# Patient Record
Sex: FEMALE | Race: WHITE | NOT HISPANIC OR LATINO | Employment: OTHER | URBAN - METROPOLITAN AREA
[De-identification: names, ages, dates, MRNs, and addresses within clinical notes are randomized per-mention and may not be internally consistent; named-entity substitution may affect disease eponyms.]

---

## 2023-09-19 PROBLEM — R20.2 TINGLING: Status: ACTIVE | Noted: 2023-09-19

## 2023-09-19 PROBLEM — L40.9 PSORIASIS: Status: ACTIVE | Noted: 2023-09-19

## 2023-09-19 PROBLEM — M81.0 AGE-RELATED OSTEOPOROSIS WITHOUT CURRENT PATHOLOGICAL FRACTURE: Status: ACTIVE | Noted: 2023-09-19

## 2023-09-19 PROBLEM — N95.2 ATROPHIC VAGINITIS: Status: ACTIVE | Noted: 2023-09-19

## 2023-09-19 PROBLEM — R03.0 ELEVATED BLOOD PRESSURE READING: Status: ACTIVE | Noted: 2023-09-19

## 2023-09-19 PROBLEM — L40.50 PSORIATIC ARTHRITIS (MULTI): Status: ACTIVE | Noted: 2023-09-19

## 2023-09-19 PROBLEM — E03.8 SUBCLINICAL HYPOTHYROIDISM: Status: ACTIVE | Noted: 2023-09-19

## 2023-09-19 RX ORDER — ETANERCEPT 50 MG/ML
SOLUTION SUBCUTANEOUS
COMMUNITY
End: 2023-10-12 | Stop reason: ALTCHOICE

## 2023-09-19 RX ORDER — IBUPROFEN 200 MG
TABLET ORAL
COMMUNITY

## 2023-09-19 RX ORDER — MECOBALAMIN 1000 MCG
TABLET,CHEWABLE ORAL
COMMUNITY

## 2023-10-12 ENCOUNTER — OFFICE VISIT (OUTPATIENT)
Dept: PRIMARY CARE | Facility: CLINIC | Age: 68
End: 2023-10-12
Payer: MEDICARE

## 2023-10-12 VITALS
WEIGHT: 184 LBS | BODY MASS INDEX: 25.76 KG/M2 | HEIGHT: 71 IN | RESPIRATION RATE: 16 BRPM | HEART RATE: 57 BPM | SYSTOLIC BLOOD PRESSURE: 120 MMHG | OXYGEN SATURATION: 96 % | DIASTOLIC BLOOD PRESSURE: 80 MMHG

## 2023-10-12 DIAGNOSIS — R73.9 HYPERGLYCEMIA: ICD-10-CM

## 2023-10-12 DIAGNOSIS — L40.50 PSORIATIC ARTHRITIS (MULTI): ICD-10-CM

## 2023-10-12 DIAGNOSIS — Z13.220 LIPID SCREENING: ICD-10-CM

## 2023-10-12 DIAGNOSIS — Z00.00 ROUTINE GENERAL MEDICAL EXAMINATION AT A HEALTH CARE FACILITY: Primary | ICD-10-CM

## 2023-10-12 DIAGNOSIS — H04.123 DRY EYES: ICD-10-CM

## 2023-10-12 DIAGNOSIS — Z12.31 ENCOUNTER FOR SCREENING MAMMOGRAM FOR MALIGNANT NEOPLASM OF BREAST: ICD-10-CM

## 2023-10-12 DIAGNOSIS — L40.9 PSORIASIS: ICD-10-CM

## 2023-10-12 DIAGNOSIS — E03.8 SUBCLINICAL HYPOTHYROIDISM: ICD-10-CM

## 2023-10-12 DIAGNOSIS — M81.0 AGE-RELATED OSTEOPOROSIS WITHOUT CURRENT PATHOLOGICAL FRACTURE: ICD-10-CM

## 2023-10-12 DIAGNOSIS — Z23 ENCOUNTER FOR IMMUNIZATION: ICD-10-CM

## 2023-10-12 DIAGNOSIS — R03.0 ELEVATED BLOOD PRESSURE READING: ICD-10-CM

## 2023-10-12 PROCEDURE — 1126F AMNT PAIN NOTED NONE PRSNT: CPT | Performed by: INTERNAL MEDICINE

## 2023-10-12 PROCEDURE — 1036F TOBACCO NON-USER: CPT | Performed by: INTERNAL MEDICINE

## 2023-10-12 PROCEDURE — 1159F MED LIST DOCD IN RCRD: CPT | Performed by: INTERNAL MEDICINE

## 2023-10-12 PROCEDURE — G0439 PPPS, SUBSEQ VISIT: HCPCS | Performed by: INTERNAL MEDICINE

## 2023-10-12 RX ORDER — IBUPROFEN 100 MG/5ML
1 SUSPENSION, ORAL (FINAL DOSE FORM) ORAL DAILY
COMMUNITY

## 2023-10-12 RX ORDER — CLOBETASOL PROPIONATE 0.5 MG/ML
1 LOTION TOPICAL 2 TIMES DAILY
COMMUNITY

## 2023-10-12 RX ORDER — IBUPROFEN 800 MG/1
200 TABLET ORAL EVERY 8 HOURS PRN
COMMUNITY
Start: 2023-06-28 | End: 2023-10-12 | Stop reason: ALTCHOICE

## 2023-10-12 RX ORDER — LANOLIN ALCOHOL/MO/W.PET/CERES
1000 CREAM (GRAM) TOPICAL DAILY
COMMUNITY

## 2023-10-12 RX ORDER — GLUCOSAMINE/CHONDR SU A SOD 750-600 MG
1 TABLET ORAL EVERY 24 HOURS
COMMUNITY

## 2023-10-12 RX ORDER — CHOLECALCIFEROL (VITAMIN D3) 25 MCG
1000 TABLET ORAL DAILY
COMMUNITY

## 2023-10-12 RX ORDER — DIFLORASONE DIACETATE 0.5 MG/G
1 OINTMENT TOPICAL DAILY
COMMUNITY

## 2023-10-12 ASSESSMENT — ENCOUNTER SYMPTOMS
DIFFICULTY URINATING: 0
COUGH: 0
ABDOMINAL PAIN: 0
SINUS PAIN: 0
OCCASIONAL FEELINGS OF UNSTEADINESS: 0
ARTHRALGIAS: 1
HEMATURIA: 0
LOSS OF SENSATION IN FEET: 0
SLEEP DISTURBANCE: 0
DIZZINESS: 0
DEPRESSION: 0
FREQUENCY: 0
FATIGUE: 0
WEAKNESS: 0
FEVER: 0
CHILLS: 0
APPETITE CHANGE: 0
JOINT SWELLING: 0
NAUSEA: 0
VOMITING: 0
DIARRHEA: 0
HEADACHES: 1
PALPITATIONS: 0
RHINORRHEA: 0
SORE THROAT: 0
CONSTIPATION: 0
NERVOUS/ANXIOUS: 0
SHORTNESS OF BREATH: 0

## 2023-10-12 ASSESSMENT — PAIN SCALES - GENERAL: PAINLEVEL: 0-NO PAIN

## 2023-10-12 ASSESSMENT — PATIENT HEALTH QUESTIONNAIRE - PHQ9
2. FEELING DOWN, DEPRESSED OR HOPELESS: NOT AT ALL
SUM OF ALL RESPONSES TO PHQ9 QUESTIONS 1 AND 2: 0
1. LITTLE INTEREST OR PLEASURE IN DOING THINGS: NOT AT ALL

## 2023-10-12 NOTE — PROGRESS NOTES
Subjective   Patient ID: Caridad Mane is a 68 y.o. female who presents for Annual Exam. Overall she is feeling well. Reports episodes of heart racing. Denies anxiety and chest pain. She does not check her pulse. C/o ear and head pressure that worsen when she lays down. Also endorses postnasal drip and knee and hip pain. Received both doses of Shingrix. Denies having recent Tdap. Her flu shot is scheduled.    Medication Documentation Review Audit       Reviewed by Ady Whitehead MA (Medical Assistant) on 10/12/23 at 0921      Medication Order Taking? Sig Documenting Provider Last Dose Status   ascorbic acid (Vitamin C) 1,000 mg tablet [id number] Yes Take 1 tablet (1,000 mg) by mouth once daily. Minerva Bryant MD Taking Active   cholecalciferol (Vitamin D-3) 25 MCG (1000 UT) tablet [id number] Yes Take 1 tablet (1,000 Units) by mouth once daily. Minerva Bryant MD Taking Active   clobetasoL 0.05 % lotion [id number] Yes Apply 1 Application topically 2 times a day. Minerva Bryant MD Taking Active   cyanocobalamin (Vitamin B-12) 1,000 mcg tablet [id number] Yes Take 1 tablet (1,000 mcg) by mouth once daily. Minerva Bryant MD Taking Active   diflorasone (Psorcon) 0.05 % ointment [id number] Yes Apply 1 Application topically once daily. Minerva Bryant MD Taking Active     Discontinued 10/12/23 0921   etanercept (Enbrel) 50 mg/mL (1 mL) injection [id number] Yes Inject 50 mcg under the skin once a week. Minerva Bryant MD Taking Active   ibuprofen (AdviL) 200 mg tablet [id number] Yes 1 tablet with food or milk as needed Orally Three times a day Minerva Bryant MD Taking Active    Discontinued 10/12/23 0921   mecobalamin, vitamin B12, 1,000 mcg tablet,chewable [id number] Yes as directed Orally Minerva Bryant MD Taking Active   vitamin E mixed 400 unit capsule [id number] Yes Take 1 capsule by mouth once every 24 hours. Minerva Bryant MD Taking Active                  History reviewed.  "No pertinent past medical history.  History reviewed. No pertinent surgical history.      Review of Systems   Constitutional:  Negative for appetite change, chills, fatigue and fever.   HENT:  Positive for ear pain and postnasal drip. Negative for rhinorrhea, sinus pain and sore throat.    Eyes:  Negative for visual disturbance.   Respiratory:  Negative for cough and shortness of breath.    Cardiovascular:  Negative for chest pain and palpitations.        Heart racing   Gastrointestinal:  Negative for abdominal pain, constipation, diarrhea, nausea and vomiting.   Genitourinary:  Negative for difficulty urinating, frequency and hematuria.   Musculoskeletal:  Positive for arthralgias (knee and hip pain). Negative for joint swelling.   Skin:  Negative for rash.   Neurological:  Positive for headaches. Negative for dizziness and weakness.   Psychiatric/Behavioral:  Negative for sleep disturbance. The patient is not nervous/anxious.        Objective   /80   Pulse 57   Resp 16   Ht 1.803 m (5' 11\")   Wt 83.5 kg (184 lb)   SpO2 96%   BMI 25.66 kg/m²     Physical Exam  HENT:      Right Ear: Tympanic membrane normal.      Left Ear: Tympanic membrane normal.      Mouth/Throat:      Pharynx: Oropharynx is clear. No oropharyngeal exudate.   Eyes:      Conjunctiva/sclera: Conjunctivae normal.      Pupils: Pupils are equal, round, and reactive to light.   Neck:      Thyroid: No thyromegaly.      Vascular: No carotid bruit.   Cardiovascular:      Rate and Rhythm: Normal rate and regular rhythm.      Heart sounds: Normal heart sounds.   Pulmonary:      Breath sounds: Normal breath sounds.   Chest:   Breasts:     Right: Normal. No mass or tenderness.      Left: Normal. No mass or tenderness.   Abdominal:      Palpations: Abdomen is soft. There is no hepatomegaly.      Tenderness: There is no abdominal tenderness.   Musculoskeletal:         General: No swelling.      Right hip: Normal.      Left hip: Normal.      Right " knee: Normal.      Left knee: Crepitus present. Normal range of motion.   Lymphadenopathy:      Cervical: No cervical adenopathy.   Skin:     General: Skin is warm.      Findings: No rash.   Neurological:      Mental Status: She is alert and oriented to person, place, and time.      Cranial Nerves: Cranial nerves 2-12 are intact.      Motor: Motor function is intact.      Coordination: Coordination is intact.   Psychiatric:         Mood and Affect: Mood and affect normal.         Behavior: Behavior normal. Behavior is cooperative.         Cognition and Memory: Cognition normal.     Diagnostics Reviewed: 2022 DEXA: normal. 4/2023 mammogram nml.  Labs Reviewed: 4/2023 BMP nml. 12/2022 ANNETTA positive      Assessment/Plan   Diagnoses and all orders for this visit:  Routine general medical examination at a health care facility  Encounter for screening mammogram for malignant neoplasm of breast  Age-related osteoporosis without current pathological fracture  Encounter for immunization  Subclinical hypothyroidism  Elevated blood pressure reading  Lipid screening  Psoriatic arthritis (CMS/HCC)  Psoriasis  Hyperglycemia  Dry eyes      Scribe Attestation  By signing my name below, ICynthia, Scribe attest that this documentation has been prepared under the direction and in the presence of Myra Anguiano MD.

## 2024-04-04 ENCOUNTER — OFFICE VISIT (OUTPATIENT)
Dept: OPHTHALMOLOGY | Facility: CLINIC | Age: 69
End: 2024-04-04
Payer: MEDICARE

## 2024-04-04 DIAGNOSIS — H52.7 REFRACTION ERROR: ICD-10-CM

## 2024-04-04 DIAGNOSIS — H43.813 PVD (POSTERIOR VITREOUS DETACHMENT), BILATERAL: Primary | ICD-10-CM

## 2024-04-04 DIAGNOSIS — H25.13 AGE-RELATED NUCLEAR CATARACT OF BOTH EYES: ICD-10-CM

## 2024-04-04 PROCEDURE — 99213 OFFICE O/P EST LOW 20 MIN: CPT | Performed by: OPHTHALMOLOGY

## 2024-04-04 ASSESSMENT — VISUAL ACUITY
OS_CC+: -2
OD_CC: 20/20
OS_CC: 20/20
CORRECTION_TYPE: GLASSES
METHOD: SNELLEN - LINEAR

## 2024-04-04 ASSESSMENT — ENCOUNTER SYMPTOMS
ALLERGIC/IMMUNOLOGIC NEGATIVE: 0
OCCASIONAL FEELINGS OF UNSTEADINESS: 0
CARDIOVASCULAR NEGATIVE: 0
ENDOCRINE NEGATIVE: 0
CONSTITUTIONAL NEGATIVE: 0
PSYCHIATRIC NEGATIVE: 0
EYES NEGATIVE: 0
HEMATOLOGIC/LYMPHATIC NEGATIVE: 0
MUSCULOSKELETAL NEGATIVE: 0
LOSS OF SENSATION IN FEET: 0
RESPIRATORY NEGATIVE: 0
NEUROLOGICAL NEGATIVE: 0
DEPRESSION: 0
GASTROINTESTINAL NEGATIVE: 0

## 2024-04-04 ASSESSMENT — REFRACTION_WEARINGRX
OS_SPHERE: -3.25
OS_CYLINDER: -0.75
OS_AXIS: 79
OD_SPHERE: -3.50
OD_CYLINDER: -0.75
SPECS_TYPE: SVL
OD_AXIS: 82

## 2024-04-04 ASSESSMENT — TONOMETRY
OS_IOP_MMHG: 14
OD_IOP_MMHG: 12
IOP_METHOD: GOLDMANN APPLANATION

## 2024-04-04 ASSESSMENT — EXTERNAL EXAM - LEFT EYE: OS_EXAM: NORMAL

## 2024-04-04 ASSESSMENT — PATIENT HEALTH QUESTIONNAIRE - PHQ9
2. FEELING DOWN, DEPRESSED OR HOPELESS: NOT AT ALL
1. LITTLE INTEREST OR PLEASURE IN DOING THINGS: NOT AT ALL
SUM OF ALL RESPONSES TO PHQ9 QUESTIONS 1 AND 2: 0

## 2024-04-04 ASSESSMENT — SLIT LAMP EXAM - LIDS
COMMENTS: NORMAL
COMMENTS: NORMAL

## 2024-04-04 ASSESSMENT — CUP TO DISC RATIO: OD_RATIO: 0.25

## 2024-04-04 ASSESSMENT — EXTERNAL EXAM - RIGHT EYE: OD_EXAM: NORMAL

## 2024-04-04 ASSESSMENT — PAIN SCALES - GENERAL: PAINLEVEL: 0-NO PAIN

## 2024-04-04 NOTE — PATIENT INSTRUCTIONS
Thank you so much for choosing me to provide your care today!    If you were dilated your vision may remain blurry   or light sensitive for several hours.    The nature of eye and vision problems can require frequent follow up, please make every effort to adhere to any future appointments.    If you have any issues, questions, or concerns,   please do not hesitate to reach out.    If you receive a survey in regards to your care today, please mention any exceptional care my office staff and/or technicians provided.    You can reach our office at this number:  [phone]     You were advised to you have a vitreous detachment or softening.    As we age the gel inside the eye shrinks, and moves forward away from the retina. The fibrous ring that used to anchor the gel to the back of the eye is now floating freely inside the eye and is seen as a large floater. Other optical imperfections may also be visible.  This floater will never go away but will become less noticeable with time. The floater will be apparent most often while reading and in bright light. Also as this gel shrinks it can tug on the retina causing the patient to see flashes of light. The concern is that as it tugs, it may tear the retina leading to a retinal detachment which you will be at some risk for.  If you notice an increase in number or frequency of floaters or flashes or if you notice a curtain falling over any part of your vision this could indicate a problem with the retina that could require immediate treatment. Please call the office ASAP if the listed symptoms occur.

## 2024-04-04 NOTE — ASSESSMENT & PLAN NOTE
Overdue for refraction and soft contact lens (SCL) check. Will recheck numbers next visit, will provide copy of previous soft contact lens (SCL) numbers as doing well overall.

## 2024-04-04 NOTE — PROGRESS NOTES
Assessment/Plan   Problem List Items Addressed This Visit       PVD (posterior vitreous detachment), bilateral - Primary     PVD right eye (OD) symptomatic with floater, no signs of retinal compromise. Will have monitor symptoms for retinal detachment (RD). To call if any worsening. Is due for full checkup so will plan repeat dilated check in few months or sooner PRN.          Age-related nuclear cataract of both eyes     Suspect non significant and still with good vision. Will recheck with serial exam.          Refraction error     Overdue for refraction and soft contact lens (SCL) check. Will recheck numbers next visit, will provide copy of previous soft contact lens (SCL) numbers as doing well overall.             Provided reassurance regarding above diagnoses and care received in the office visit today. Discussed outcomes and options along with the importance of treatment compliance. Understands the importance of any follow up visits. Patient instructed to call/communicate with our office if any new issues, questions, or concerns.     Will plan to see back in 2-3 months full with SCL or sooner PRN

## 2024-04-04 NOTE — ASSESSMENT & PLAN NOTE
PVD right eye (OD) symptomatic with floater, no signs of retinal compromise. Will have monitor symptoms for retinal detachment (RD). To call if any worsening. Is due for full checkup so will plan repeat dilated check in few months or sooner PRN.

## 2024-04-26 ENCOUNTER — HOSPITAL ENCOUNTER (OUTPATIENT)
Dept: RADIOLOGY | Facility: HOSPITAL | Age: 69
Discharge: HOME | End: 2024-04-26
Payer: MEDICARE

## 2024-04-26 VITALS — WEIGHT: 180 LBS | BODY MASS INDEX: 24.38 KG/M2 | HEIGHT: 72 IN

## 2024-04-26 DIAGNOSIS — Z12.31 ENCOUNTER FOR SCREENING MAMMOGRAM FOR MALIGNANT NEOPLASM OF BREAST: ICD-10-CM

## 2024-04-26 PROCEDURE — 77067 SCR MAMMO BI INCL CAD: CPT

## 2024-04-26 PROCEDURE — 77063 BREAST TOMOSYNTHESIS BI: CPT | Performed by: STUDENT IN AN ORGANIZED HEALTH CARE EDUCATION/TRAINING PROGRAM

## 2024-04-26 PROCEDURE — 77067 SCR MAMMO BI INCL CAD: CPT | Performed by: STUDENT IN AN ORGANIZED HEALTH CARE EDUCATION/TRAINING PROGRAM

## 2024-07-03 ENCOUNTER — APPOINTMENT (OUTPATIENT)
Dept: OPHTHALMOLOGY | Facility: CLINIC | Age: 69
End: 2024-07-03
Payer: MEDICARE

## 2024-07-22 ENCOUNTER — OFFICE VISIT (OUTPATIENT)
Dept: OPHTHALMOLOGY | Facility: CLINIC | Age: 69
End: 2024-07-22
Payer: MEDICARE

## 2024-07-22 DIAGNOSIS — H52.7 REFRACTION ERROR: ICD-10-CM

## 2024-07-22 DIAGNOSIS — H43.813 PVD (POSTERIOR VITREOUS DETACHMENT), BILATERAL: Primary | ICD-10-CM

## 2024-07-22 DIAGNOSIS — H25.13 AGE-RELATED NUCLEAR CATARACT OF BOTH EYES: ICD-10-CM

## 2024-07-22 PROCEDURE — 92015 DETERMINE REFRACTIVE STATE: CPT | Performed by: OPHTHALMOLOGY

## 2024-07-22 PROCEDURE — 99214 OFFICE O/P EST MOD 30 MIN: CPT | Performed by: OPHTHALMOLOGY

## 2024-07-22 PROCEDURE — 92325 MODIFICATION OF CONTACT LENS: CPT | Performed by: OPHTHALMOLOGY

## 2024-07-22 RX ORDER — CLOBETASOL PROPIONATE 0.5 MG/G
CREAM TOPICAL
COMMUNITY
Start: 2024-06-06

## 2024-07-22 RX ORDER — VALSARTAN 160 MG/1
1 TABLET ORAL
COMMUNITY
Start: 2024-05-23

## 2024-07-22 RX ORDER — ATORVASTATIN CALCIUM 80 MG/1
1 TABLET, FILM COATED ORAL
COMMUNITY
Start: 2024-07-16

## 2024-07-22 RX ORDER — ATORVASTATIN CALCIUM 40 MG/1
1 TABLET, FILM COATED ORAL
COMMUNITY
Start: 2024-05-23

## 2024-07-22 ASSESSMENT — PATIENT HEALTH QUESTIONNAIRE - PHQ9
SUM OF ALL RESPONSES TO PHQ9 QUESTIONS 1 AND 2: 0
2. FEELING DOWN, DEPRESSED OR HOPELESS: NOT AT ALL
1. LITTLE INTEREST OR PLEASURE IN DOING THINGS: NOT AT ALL

## 2024-07-22 ASSESSMENT — VISUAL ACUITY
METHOD: SNELLEN - LINEAR
CORRECTION_TYPE: CONTACTS
OD_CC: 20/20
CORRECTION_TYPE: GLASSES
OS_CC: J1
METHOD: SNELLEN - LINEAR
OD_CC: 20/20
OS_CC: 20/20

## 2024-07-22 ASSESSMENT — ENCOUNTER SYMPTOMS
CARDIOVASCULAR NEGATIVE: 0
EYES NEGATIVE: 0
CONSTITUTIONAL NEGATIVE: 0
GASTROINTESTINAL NEGATIVE: 0
PSYCHIATRIC NEGATIVE: 0
HEMATOLOGIC/LYMPHATIC NEGATIVE: 0
RESPIRATORY NEGATIVE: 0
ALLERGIC/IMMUNOLOGIC NEGATIVE: 0
MUSCULOSKELETAL NEGATIVE: 0
NEUROLOGICAL NEGATIVE: 0
ENDOCRINE NEGATIVE: 0

## 2024-07-22 ASSESSMENT — REFRACTION_CURRENTRX
OD_BASECURVE: 8.6
OS_BASECURVE: 8.6
OS_BRAND: BIOFINITY ENERGYS
OD_SPHERE: -3.75
OD_BRAND: BIOFINITY ENERGYS
OS_SPHERE: -2.00
OS_DIAMETER: 14.0
OD_DIAMETER: 14.0

## 2024-07-22 ASSESSMENT — KERATOMETRY
OS_K1POWER_DIOPTERS: 43.75
OS_AXISANGLE2_DEGREES: 125
OD_K1POWER_DIOPTERS: 44.00
OS_K2POWER_DIOPTERS: 44.25
OS_AXISANGLE_DEGREES: 35
OD_AXISANGLE2_DEGREES: 85
OD_AXISANGLE_DEGREES: 175
OD_K2POWER_DIOPTERS: 44.25

## 2024-07-22 ASSESSMENT — PAIN SCALES - GENERAL: PAINLEVEL: 0-NO PAIN

## 2024-07-22 ASSESSMENT — REFRACTION_WEARINGRX
OD_SPHERE: -3.00
OS_AXIS: 084
OS_SPHERE: -3.25
OD_AXIS: 090
OS_CYLINDER: -0.50
OD_CYLINDER: -0.75

## 2024-07-22 ASSESSMENT — EXTERNAL EXAM - RIGHT EYE: OD_EXAM: NORMAL

## 2024-07-22 ASSESSMENT — TONOMETRY
IOP_METHOD: GOLDMANN APPLANATION
OS_IOP_MMHG: 15
OD_IOP_MMHG: 14

## 2024-07-22 ASSESSMENT — EXTERNAL EXAM - LEFT EYE: OS_EXAM: NORMAL

## 2024-07-22 ASSESSMENT — CUP TO DISC RATIO
OD_RATIO: 0.25
OS_RATIO: 0.25

## 2024-07-22 ASSESSMENT — SLIT LAMP EXAM - LIDS
COMMENTS: NORMAL
COMMENTS: NORMAL

## 2024-07-22 NOTE — ASSESSMENT & PLAN NOTE
Discussed glasses prescription from refraction. Will provide if patient interested in keeping for records or to fill as a new set of glasses.   Soft contact lens (SCL) numbers unchanged. Advised could consider trials of more near power left eye (OS) but could come at cost of right eye (OD) distance comfort. Will call us if increased lubrication doesn't provide a boost.

## 2024-07-22 NOTE — PATIENT INSTRUCTIONS
Thank you so much for choosing me to provide your care today!    If you were dilated your vision may remain blurry   or light sensitive for several hours.    The nature of eye and vision problems can require frequent follow up, please make every effort to adhere to any future appointments.    If you have any issues, questions, or concerns,   please do not hesitate to reach out.    If you receive a survey in regards to your care today, please mention any exceptional care my office staff and/or technicians provided.    You can reach our office at this number:  [phone]

## 2024-07-22 NOTE — ASSESSMENT & PLAN NOTE
Stable PVD both eyes (OU), advised would continue to expect gradual improvement in symptoms with more time. Could pursue retina evaluation if symptoms becoming too bothersome. Will monitor with serial exam and understands to call if any worsening signs.

## 2024-07-22 NOTE — PROGRESS NOTES
Assessment/Plan   Problem List Items Addressed This Visit       PVD (posterior vitreous detachment), bilateral - Primary     Stable PVD both eyes (OU), advised would continue to expect gradual improvement in symptoms with more time. Could pursue retina evaluation if symptoms becoming too bothersome. Will monitor with serial exam and understands to call if any worsening signs.          Age-related nuclear cataract of both eyes     Non significant cataract noted on exam. Will plan to continue to monitor with serial exam.            Refraction error     Discussed glasses prescription from refraction. Will provide if patient interested in keeping for records or to fill as a new set of glasses.   Soft contact lens (SCL) numbers unchanged. Advised could consider trials of more near power left eye (OS) but could come at cost of right eye (OD) distance comfort. Will call us if increased lubrication doesn't provide a boost.             Provided reassurance regarding above diagnoses and care received in the office visit today. Discussed outcomes and options along with the importance of treatment compliance. Understands the importance of any follow up visits. Patient instructed to call/communicate with our office if any new issues, questions, or concerns.     Will plan to see back in 1 year full SCL or sooner PRN

## 2024-09-23 ENCOUNTER — TELEMEDICINE (OUTPATIENT)
Dept: PRIMARY CARE | Facility: CLINIC | Age: 69
End: 2024-09-23
Payer: MEDICARE

## 2024-09-23 VITALS — HEIGHT: 72 IN | BODY MASS INDEX: 25.06 KG/M2 | TEMPERATURE: 97 F | WEIGHT: 185 LBS

## 2024-09-23 DIAGNOSIS — J01.10 ACUTE NON-RECURRENT FRONTAL SINUSITIS: ICD-10-CM

## 2024-09-23 DIAGNOSIS — U07.1 COVID-19 VIRUS INFECTION: Primary | ICD-10-CM

## 2024-09-23 PROCEDURE — 99441 PR PHYS/QHP TELEPHONE EVALUATION 5-10 MIN: CPT | Performed by: INTERNAL MEDICINE

## 2024-09-23 PROCEDURE — 3008F BODY MASS INDEX DOCD: CPT | Performed by: INTERNAL MEDICINE

## 2024-09-23 PROCEDURE — 1159F MED LIST DOCD IN RCRD: CPT | Performed by: INTERNAL MEDICINE

## 2024-09-23 PROCEDURE — 1125F AMNT PAIN NOTED PAIN PRSNT: CPT | Performed by: INTERNAL MEDICINE

## 2024-09-23 RX ORDER — ASPIRIN 81 MG/1
81 TABLET ORAL DAILY
COMMUNITY

## 2024-09-23 RX ORDER — METHYLPREDNISOLONE 4 MG/1
TABLET ORAL
Qty: 21 TABLET | Refills: 0 | Status: SHIPPED | OUTPATIENT
Start: 2024-09-23 | End: 2024-09-30

## 2024-09-23 RX ORDER — AMOXICILLIN AND CLAVULANATE POTASSIUM 875; 125 MG/1; MG/1
875 TABLET, FILM COATED ORAL 2 TIMES DAILY
Qty: 20 TABLET | Refills: 0 | Status: SHIPPED | OUTPATIENT
Start: 2024-09-23 | End: 2024-10-03

## 2024-09-23 ASSESSMENT — PATIENT HEALTH QUESTIONNAIRE - PHQ9
1. LITTLE INTEREST OR PLEASURE IN DOING THINGS: NOT AT ALL
SUM OF ALL RESPONSES TO PHQ9 QUESTIONS 1 AND 2: 0
2. FEELING DOWN, DEPRESSED OR HOPELESS: NOT AT ALL

## 2024-09-23 ASSESSMENT — ENCOUNTER SYMPTOMS
LOSS OF SENSATION IN FEET: 0
OCCASIONAL FEELINGS OF UNSTEADINESS: 0
SINUS COMPLAINT: 1
DIARRHEA: 1
DEPRESSION: 0
HEADACHES: 1

## 2024-09-23 ASSESSMENT — PAIN SCALES - GENERAL: PAINLEVEL: 2

## 2024-09-23 NOTE — PROGRESS NOTES
Subjective   Patient ID: Caridad Mane is a 69 y.o. female who presents for Headache and Sinus Problem (Pt tested positive for covid last week).    Patient is feeling sick and recently tested positive for Covid. Has a constant congestion in her head, tinnitus, pnd and itchy ears.     Diagnostics Reviewed:  Labs Reviewed:      Earache   There is pain in both ears. This is a new problem. The current episode started yesterday. The problem has been unchanged. The maximum temperature recorded prior to her arrival was 100.4 - 100.9 F. The fever has been present for 1 to 2 days. The pain is at a severity of 2/10. Associated symptoms include diarrhea and headaches.   Headache   Associated symptoms include ear pain.   Sinus Problem  Associated symptoms include congestion, ear pain and headaches.        Review of Systems   HENT:  Positive for congestion, ear pain and postnasal drip.    Gastrointestinal:  Positive for diarrhea.   Neurological:  Positive for headaches.       Objective   Temp 36.1 °C (97 °F)   Ht 1.829 m (6')   Wt 83.9 kg (185 lb)   BMI 25.09 kg/m²     Physical Exam    Assessment/Plan   Diagnoses and all orders for this visit:  COVID-19 virus infection  Acute non-recurrent frontal sinusitis  -     methylPREDNISolone (Medrol Dospak) 4 mg tablets; Take as directed on package.  -     amoxicillin-pot clavulanate (Augmentin) 875-125 mg tablet; Take 1 tablet (875 mg) by mouth 2 times a day for 10 days.      Scribe Attestation  By signing my name below, IKennedy Scribe   attest that this documentation has been prepared under the direction and in the presence of Myra Anguiano MD.

## 2024-09-30 ASSESSMENT — PROMIS GLOBAL HEALTH SCALE
RATE_MENTAL_HEALTH: GOOD
RATE_QUALITY_OF_LIFE: GOOD
RATE_SOCIAL_SATISFACTION: GOOD
CARRYOUT_SOCIAL_ACTIVITIES: GOOD
CARRYOUT_PHYSICAL_ACTIVITIES: MOSTLY
EMOTIONAL_PROBLEMS: SOMETIMES
RATE_AVERAGE_FATIGUE: MODERATE
RATE_GENERAL_HEALTH: GOOD
RATE_PHYSICAL_HEALTH: GOOD
RATE_AVERAGE_PAIN: 2

## 2024-10-03 ENCOUNTER — APPOINTMENT (OUTPATIENT)
Dept: PRIMARY CARE | Facility: CLINIC | Age: 69
End: 2024-10-03
Payer: MEDICARE

## 2024-10-04 ENCOUNTER — OFFICE VISIT (OUTPATIENT)
Dept: PRIMARY CARE | Facility: CLINIC | Age: 69
End: 2024-10-04
Payer: MEDICARE

## 2024-10-04 VITALS
OXYGEN SATURATION: 97 % | HEART RATE: 60 BPM | DIASTOLIC BLOOD PRESSURE: 80 MMHG | HEIGHT: 72 IN | WEIGHT: 184 LBS | RESPIRATION RATE: 16 BRPM | SYSTOLIC BLOOD PRESSURE: 120 MMHG | BODY MASS INDEX: 24.92 KG/M2

## 2024-10-04 DIAGNOSIS — J32.1 CHRONIC FRONTAL SINUSITIS: ICD-10-CM

## 2024-10-04 DIAGNOSIS — Z00.00 ROUTINE GENERAL MEDICAL EXAMINATION AT A HEALTH CARE FACILITY: Primary | ICD-10-CM

## 2024-10-04 DIAGNOSIS — M81.0 AGE-RELATED OSTEOPOROSIS WITHOUT CURRENT PATHOLOGICAL FRACTURE: ICD-10-CM

## 2024-10-04 DIAGNOSIS — Z23 IMMUNIZATION DUE: ICD-10-CM

## 2024-10-04 DIAGNOSIS — R73.9 HYPERGLYCEMIA: ICD-10-CM

## 2024-10-04 DIAGNOSIS — L40.50 PSORIATIC ARTHRITIS (MULTI): ICD-10-CM

## 2024-10-04 DIAGNOSIS — E78.2 MIXED HYPERLIPIDEMIA: ICD-10-CM

## 2024-10-04 PROCEDURE — 99397 PER PM REEVAL EST PAT 65+ YR: CPT | Performed by: INTERNAL MEDICINE

## 2024-10-04 PROCEDURE — 99215 OFFICE O/P EST HI 40 MIN: CPT | Mod: 25 | Performed by: INTERNAL MEDICINE

## 2024-10-04 PROCEDURE — 1159F MED LIST DOCD IN RCRD: CPT | Performed by: INTERNAL MEDICINE

## 2024-10-04 PROCEDURE — G0439 PPPS, SUBSEQ VISIT: HCPCS | Performed by: INTERNAL MEDICINE

## 2024-10-04 PROCEDURE — 90471 IMMUNIZATION ADMIN: CPT | Performed by: INTERNAL MEDICINE

## 2024-10-04 PROCEDURE — 1123F ACP DISCUSS/DSCN MKR DOCD: CPT | Performed by: INTERNAL MEDICINE

## 2024-10-04 PROCEDURE — 3008F BODY MASS INDEX DOCD: CPT | Performed by: INTERNAL MEDICINE

## 2024-10-04 PROCEDURE — 1126F AMNT PAIN NOTED NONE PRSNT: CPT | Performed by: INTERNAL MEDICINE

## 2024-10-04 PROCEDURE — G0008 ADMIN INFLUENZA VIRUS VAC: HCPCS | Mod: 59 | Performed by: INTERNAL MEDICINE

## 2024-10-04 PROCEDURE — 90662 IIV NO PRSV INCREASED AG IM: CPT | Performed by: INTERNAL MEDICINE

## 2024-10-04 PROCEDURE — 1158F ADVNC CARE PLAN TLK DOCD: CPT | Performed by: INTERNAL MEDICINE

## 2024-10-04 ASSESSMENT — ENCOUNTER SYMPTOMS
ABDOMINAL PAIN: 0
NERVOUS/ANXIOUS: 0
CONSTIPATION: 0
WEAKNESS: 0
DYSURIA: 0
DIFFICULTY URINATING: 0
SINUS PAIN: 0
DIARRHEA: 0
HEMATURIA: 0
OCCASIONAL FEELINGS OF UNSTEADINESS: 0
DIZZINESS: 0
DEPRESSION: 0
APPETITE CHANGE: 0
SLEEP DISTURBANCE: 0
VOMITING: 0
NAUSEA: 0
PALPITATIONS: 0
CHILLS: 0
FATIGUE: 0
LOSS OF SENSATION IN FEET: 0
JOINT SWELLING: 0
FREQUENCY: 0
SORE THROAT: 0
COUGH: 0
ARTHRALGIAS: 0
RHINORRHEA: 0
HEADACHES: 0
SHORTNESS OF BREATH: 0
BACK PAIN: 1
FEVER: 0

## 2024-10-04 ASSESSMENT — PAIN SCALES - GENERAL: PAINLEVEL: 0-NO PAIN

## 2024-10-04 NOTE — PATIENT INSTRUCTIONS
Please obtain AREXVY (the RSV vaccine) at your local pharmacy. Received flu and TDAP vaccines in office today.

## 2024-10-04 NOTE — PROGRESS NOTES
Subjective   Patient ID: Caridad Mane is a 69 y.o. female who presents for Annual Exam.    Patient is doing well overall but is still recovering from a previous episode of Covid-19. Rarely takes ibuprofen for occasional headaches. Received flu and TDAP vaccines in office today. Has both doses of the shingrix vaccine. Sees ophthalmologist and dentist regularly. No hx of smoking.       Diagnostics Reviewed: 6/1/2023 Colonoscopy revealed diverticulosis.      4/26/2024 BI Mammo Bilateral was normal.           Labs Reviewed: 12/21/2020 CMP: calcium wnl.      12/8/2022 TSH wnl.        12/8/2022 Vitamin D wnl.       4/25/2022 XR DEXA wnl.              Review of Systems   Constitutional:  Negative for appetite change, chills, fatigue and fever.   HENT:  Negative for ear pain, rhinorrhea, sinus pain and sore throat.    Eyes:  Negative for visual disturbance.   Respiratory:  Negative for cough and shortness of breath.    Cardiovascular:  Negative for chest pain and palpitations.   Gastrointestinal:  Negative for abdominal pain, constipation, diarrhea, nausea and vomiting.   Genitourinary:  Negative for difficulty urinating, dysuria, frequency and hematuria.   Musculoskeletal:  Positive for back pain. Negative for arthralgias (knees and hips) and joint swelling.   Skin:  Negative for rash.   Neurological:  Negative for dizziness, weakness and headaches.   Psychiatric/Behavioral:  Negative for sleep disturbance. The patient is not nervous/anxious.        Objective   /80   Pulse 60   Resp 16   Ht 1.829 m (6')   Wt 83.5 kg (184 lb)   SpO2 97%   BMI 24.95 kg/m²     Physical Exam  HENT:      Right Ear: Tympanic membrane normal.      Left Ear: Tympanic membrane normal.      Mouth/Throat:      Pharynx: Oropharynx is clear. No oropharyngeal exudate.   Eyes:      Conjunctiva/sclera: Conjunctivae normal.      Pupils: Pupils are equal, round, and reactive to light.   Neck:      Thyroid: No thyromegaly.      Vascular: No  carotid bruit.   Cardiovascular:      Rate and Rhythm: Regular rhythm.      Heart sounds: Normal heart sounds.   Pulmonary:      Breath sounds: Normal breath sounds.   Abdominal:      Palpations: Abdomen is soft. There is no hepatomegaly.      Tenderness: There is no abdominal tenderness.   Musculoskeletal:      Right lower leg: No edema.      Left lower leg: No edema.   Lymphadenopathy:      Cervical: No cervical adenopathy.   Skin:     General: Skin is warm.      Comments: No suspicious moles   Neurological:      Mental Status: She is alert and oriented to person, place, and time.      Gait: Gait is intact.   Psychiatric:         Mood and Affect: Mood and affect normal.         Behavior: Behavior normal. Behavior is cooperative.         Cognition and Memory: Cognition normal.       Assessment/Plan   Diagnoses and all orders for this visit:  Routine general medical examination at a health care facility  Immunization due  -     Flu vaccine, trivalent, preservative free, HIGH-DOSE, age 65y+ (Fluzone)  -     Tdap vaccine, age 7 years and older  (BOOSTRIX)  Age-related osteoporosis without current pathological fracture  Psoriatic arthritis (Multi)  Hyperglycemia  -     Hemoglobin A1C; Future  Mixed hyperlipidemia  Chronic frontal sinusitis      Scribe Attestation  By signing my name below, IKennedy, Scribmerary   attest that this documentation has been prepared under the direction and in the presence of Myra Anguiano MD.

## 2024-10-10 ENCOUNTER — LAB (OUTPATIENT)
Dept: LAB | Facility: LAB | Age: 69
End: 2024-10-10
Payer: MEDICARE

## 2024-10-10 DIAGNOSIS — R73.9 HYPERGLYCEMIA: ICD-10-CM

## 2024-10-10 DIAGNOSIS — H04.123 DRY EYES: ICD-10-CM

## 2024-10-10 DIAGNOSIS — E03.8 SUBCLINICAL HYPOTHYROIDISM: ICD-10-CM

## 2024-10-10 DIAGNOSIS — R03.0 ELEVATED BLOOD PRESSURE READING: ICD-10-CM

## 2024-10-10 DIAGNOSIS — Z13.220 LIPID SCREENING: ICD-10-CM

## 2024-10-10 LAB
EST. AVERAGE GLUCOSE BLD GHB EST-MCNC: 97 MG/DL
HBA1C MFR BLD: 5 %

## 2024-10-10 PROCEDURE — 83036 HEMOGLOBIN GLYCOSYLATED A1C: CPT

## 2024-10-10 PROCEDURE — 36415 COLL VENOUS BLD VENIPUNCTURE: CPT

## 2025-06-19 ENCOUNTER — OFFICE VISIT (OUTPATIENT)
Facility: CLINIC | Age: 70
End: 2025-06-19
Payer: MEDICARE

## 2025-06-19 VITALS
DIASTOLIC BLOOD PRESSURE: 70 MMHG | OXYGEN SATURATION: 98 % | SYSTOLIC BLOOD PRESSURE: 124 MMHG | BODY MASS INDEX: 24.01 KG/M2 | HEART RATE: 73 BPM | WEIGHT: 177 LBS

## 2025-06-19 DIAGNOSIS — I10 ESSENTIAL (PRIMARY) HYPERTENSION: ICD-10-CM

## 2025-06-19 DIAGNOSIS — E78.49 OTHER HYPERLIPIDEMIA: ICD-10-CM

## 2025-06-19 DIAGNOSIS — I10 PRIMARY HYPERTENSION: Primary | ICD-10-CM

## 2025-06-19 PROCEDURE — 93005 ELECTROCARDIOGRAM TRACING: CPT | Performed by: INTERNAL MEDICINE

## 2025-06-19 PROCEDURE — 99203 OFFICE O/P NEW LOW 30 MIN: CPT | Performed by: INTERNAL MEDICINE

## 2025-06-19 PROCEDURE — 1125F AMNT PAIN NOTED PAIN PRSNT: CPT | Performed by: INTERNAL MEDICINE

## 2025-06-19 PROCEDURE — 3074F SYST BP LT 130 MM HG: CPT | Performed by: INTERNAL MEDICINE

## 2025-06-19 PROCEDURE — 3078F DIAST BP <80 MM HG: CPT | Performed by: INTERNAL MEDICINE

## 2025-06-19 PROCEDURE — 1036F TOBACCO NON-USER: CPT | Performed by: INTERNAL MEDICINE

## 2025-06-19 PROCEDURE — 1123F ACP DISCUSS/DSCN MKR DOCD: CPT | Performed by: INTERNAL MEDICINE

## 2025-06-19 PROCEDURE — 99212 OFFICE O/P EST SF 10 MIN: CPT

## 2025-06-19 ASSESSMENT — PAIN SCALES - GENERAL: PAINLEVEL_OUTOF10: 3

## 2025-06-19 ASSESSMENT — ENCOUNTER SYMPTOMS
DEPRESSION: 0
PALPITATIONS: 0
FEVER: 0
SYNCOPE: 0
COUGH: 0
LOSS OF SENSATION IN FEET: 0
WEIGHT GAIN: 0
IRREGULAR HEARTBEAT: 0
WHEEZING: 0
NEAR-SYNCOPE: 0
CLAUDICATION: 0
MYALGIAS: 0
WEIGHT LOSS: 0
ORTHOPNEA: 0
OCCASIONAL FEELINGS OF UNSTEADINESS: 0
WEAKNESS: 0
PND: 0
SHORTNESS OF BREATH: 0
DIAPHORESIS: 0
DYSPNEA ON EXERTION: 0
DIZZINESS: 0

## 2025-06-19 ASSESSMENT — LIFESTYLE VARIABLES: TOTAL SCORE: 0

## 2025-06-19 NOTE — ASSESSMENT & PLAN NOTE
Lipids from May reviewed with her.  They are acceptable.  Continue 10 mg of rosuvastatin.Lifestyle modifications were discussed. I advocated for mediterranean and plant based eating. We also discussed exercise. 150 minutes a week of moderate intensity exercise is recommended per our ACC/AHA guidelines    
She is actually taking 40 mg of valsartan daily.  Reasonable as her blood pressure is well-controlled.  Encouraged to check blood pressure at home 2-3 times a week.  
- - -

## 2025-06-19 NOTE — PROGRESS NOTES
Subjective      Chief Complaint   Patient presents with    Establish care, was seeing Dr. Donnelly        70-year-old female presents for cardiac evaluation.  She has a CACS in 2020 of 1.  She is a former patient of Dr. Donnelly.  She has history of hyperlipidemia and hypertension.  Her last ischemic evaluation was a myocardial SPECT stress test in June 2024 this was normal or low risk reportedly. She has no chest pain or dyspnea. She has infrequent palpitations. She walks infrequently for exercise without limitation.          Review of Systems   Constitutional: Negative for diaphoresis, fever, weight gain and weight loss.   Eyes:  Negative for visual disturbance.   Cardiovascular:  Negative for chest pain, claudication, dyspnea on exertion, irregular heartbeat, leg swelling, near-syncope, orthopnea, palpitations, paroxysmal nocturnal dyspnea and syncope.   Respiratory:  Negative for cough, shortness of breath and wheezing.    Musculoskeletal:  Negative for muscle weakness and myalgias.   Neurological:  Negative for dizziness and weakness.   All other systems reviewed and are negative.       Medical History[1]     Surgical History[2]     Social History     Socioeconomic History    Marital status:      Spouse name: Not on file    Number of children: Not on file    Years of education: Not on file    Highest education level: Not on file   Occupational History    Not on file   Tobacco Use    Smoking status: Never     Passive exposure: Never    Smokeless tobacco: Never   Vaping Use    Vaping status: Never Used   Substance and Sexual Activity    Alcohol use: Yes     Alcohol/week: 6.0 standard drinks of alcohol     Types: 6 Glasses of wine per week    Drug use: Never    Sexual activity: Not Currently   Other Topics Concern    Not on file   Social History Narrative    Not on file     Social Drivers of Health     Financial Resource Strain: Not on file   Food Insecurity: Not on file   Transportation Needs: Not  on file   Physical Activity: Not on file   Stress: Not on file   Social Connections: Not on file   Intimate Partner Violence: Not on file   Housing Stability: Not on file        Family History[3]     OBJECTIVE:    Vitals:    06/19/25 1413   BP: 124/70   Pulse: 73   SpO2: 98%        Vitals reviewed.   Constitutional:       Appearance: Normal and healthy appearance. Not in distress.   Pulmonary:      Effort: Pulmonary effort is normal.      Breath sounds: Normal breath sounds.   Cardiovascular:      Normal rate. Regular rhythm. Normal S1. Normal S2.       Murmurs: There is no murmur.      No gallop.  No click.   Pulses:     Intact distal pulses.   Edema:     Peripheral edema absent.   Skin:     General: Skin is warm and dry.   Neurological:      General: No focal deficit present.          Lab Review:   Lab Results   Component Value Date     07/18/2022    K 3.9 07/18/2022     07/18/2022    CO2 25 07/18/2022    BUN 14 09/06/2022    CREATININE 0.7 09/06/2022    GLUCOSE 121 (H) 07/18/2022    CALCIUM 9.0 07/18/2022     Lab Results   Component Value Date    CHOL 205 (H) 12/21/2020    TRIG 181 (H) 12/21/2020    HDL 51 12/21/2020       Lab Results   Component Value Date    LDLCALC 118 12/21/2020        Essential (primary) hypertension  She is actually taking 40 mg of valsartan daily.  Reasonable as her blood pressure is well-controlled.  Encouraged to check blood pressure at home 2-3 times a week.    Other hyperlipidemia  Lipids from May reviewed with her.  They are acceptable.  Continue 10 mg of rosuvastatin.Lifestyle modifications were discussed. I advocated for mediterranean and plant based eating. We also discussed exercise. 150 minutes a week of moderate intensity exercise is recommended per our ACC/AHA guidelines            [1]   Past Medical History:  Diagnosis Date    Age-related nuclear cataract, bilateral     Corneal disorder due to contact lens, bilateral     Other specified inflammations of eyelid      Refractive error     Vitreous degeneration, left    [2]   Past Surgical History:  Procedure Laterality Date    CHOLECYSTECTOMY     [3]   Family History  Problem Relation Name Age of Onset    Diabetes Mother      Epididymitis Father      Psoriasis Brother      Diabetes Brother      Breast cancer Mother's Sister

## 2025-06-20 LAB
ATRIAL RATE: 71 BPM
P AXIS: 30 DEGREES
P OFFSET: 189 MS
P ONSET: 144 MS
PR INTERVAL: 144 MS
Q ONSET: 216 MS
QRS COUNT: 12 BEATS
QRS DURATION: 78 MS
QT INTERVAL: 406 MS
QTC CALCULATION(BAZETT): 441 MS
QTC FREDERICIA: 429 MS
R AXIS: -28 DEGREES
T AXIS: 26 DEGREES
T OFFSET: 419 MS
VENTRICULAR RATE: 71 BPM

## 2025-07-28 ENCOUNTER — APPOINTMENT (OUTPATIENT)
Dept: OPHTHALMOLOGY | Facility: CLINIC | Age: 70
End: 2025-07-28
Payer: MEDICARE

## 2025-07-28 DIAGNOSIS — H43.813 PVD (POSTERIOR VITREOUS DETACHMENT), BILATERAL: Primary | ICD-10-CM

## 2025-07-28 DIAGNOSIS — H52.7 REFRACTION ERROR: ICD-10-CM

## 2025-07-28 DIAGNOSIS — H25.813 COMBINED FORMS OF AGE-RELATED CATARACT OF BOTH EYES: ICD-10-CM

## 2025-07-28 PROCEDURE — 99214 OFFICE O/P EST MOD 30 MIN: CPT | Performed by: OPHTHALMOLOGY

## 2025-07-28 PROCEDURE — FLVLF CONTACT LENS EVALUATION (SP): Performed by: OPHTHALMOLOGY

## 2025-07-28 PROCEDURE — 92015 DETERMINE REFRACTIVE STATE: CPT | Performed by: OPHTHALMOLOGY

## 2025-07-28 ASSESSMENT — REFRACTION_CURRENTRX
OS_SPHERE: -1.50
OD_DIAMETER: 14.0
OS_BASECURVE: 8.6
OS_SPHERE: -2.00
OS_BRAND: BIOFINITY ENERGYS
OD_SPHERE: -3.25
OD_BRAND: BIOFINITY ENERGYS
OS_BRAND: BIOFINITY ENERGYS
OD_DIAMETER: 14.0
OD_BRAND: BIOFINITY ENERGYS
OD_BASECURVE: 8.6
OS_DIAMETER: 14.0
OD_SPHERE: -3.75
OD_BASECURVE: 8.6
OS_BASECURVE: 8.6
OS_DIAMETER: 14.0

## 2025-07-28 ASSESSMENT — VISUAL ACUITY
OD_CC+: -1
CORRECTION_TYPE: GLASSES
OS_CC+: -2
OS_CC: J1
OD_CC+: -1
METHOD: SNELLEN - SINGLE
METHOD: SNELLEN - SINGLE
CORRECTION_TYPE: CONTACTS
OS_CC: 20/20
OD_CC: 20/20
OD_CC: 20/20

## 2025-07-28 ASSESSMENT — REFRACTION_WEARINGRX
OS_CYLINDER: -0.75
OD_SPHERE: -3.00
OD_AXIS: 092
SPECS_TYPE: SVL
OS_SPHERE: -3.25
OS_AXIS: 090
OD_CYLINDER: -0.75

## 2025-07-28 ASSESSMENT — PAIN SCALES - GENERAL: PAINLEVEL_OUTOF10: 0-NO PAIN

## 2025-07-28 ASSESSMENT — REFRACTION_MANIFEST
OS_AXIS: 080
OD_SPHERE: -2.75
OD_ADD: +2.75
OD_CYLINDER: -0.75
OS_SPHERE: -2.50
OS_CYLINDER: -0.75
METHOD_AUTOREFRACTION: 1
OD_AXIS: 100
OS_CYLINDER: -1.00
OD_SPHERE: -2.75
OS_SPHERE: -2.75
OD_CYLINDER: -1.00
OD_AXIS: 100
OS_ADD: +2.75
OS_AXIS: 085

## 2025-07-28 ASSESSMENT — ENCOUNTER SYMPTOMS
PSYCHIATRIC NEGATIVE: 0
NEUROLOGICAL NEGATIVE: 0
RESPIRATORY NEGATIVE: 0
ALLERGIC/IMMUNOLOGIC NEGATIVE: 0
CONSTITUTIONAL NEGATIVE: 0
CARDIOVASCULAR NEGATIVE: 0
EYES NEGATIVE: 0
HEMATOLOGIC/LYMPHATIC NEGATIVE: 0
GASTROINTESTINAL NEGATIVE: 0
MUSCULOSKELETAL NEGATIVE: 0
ENDOCRINE NEGATIVE: 0

## 2025-07-28 ASSESSMENT — EXTERNAL EXAM - RIGHT EYE: OD_EXAM: NORMAL

## 2025-07-28 ASSESSMENT — CUP TO DISC RATIO
OD_RATIO: 0.3
OS_RATIO: 0.3

## 2025-07-28 ASSESSMENT — TONOMETRY
IOP_METHOD: GOLDMANN APPLANATION
OS_IOP_MMHG: 16
OD_IOP_MMHG: 16

## 2025-07-28 ASSESSMENT — KERATOMETRY
OD_K2POWER_DIOPTERS: 44.00
OD_AXISANGLE2_DEGREES: 180
OS_K2POWER_DIOPTERS: 44.00
METHOD_AUTO_MANUAL: AUTOMATED
OS_AXISANGLE_DEGREES: 35
OS_K1POWER_DIOPTERS: 43.50
OS_AXISANGLE2_DEGREES: 125
OD_AXISANGLE_DEGREES: 90
OD_K1POWER_DIOPTERS: 44.00

## 2025-07-28 ASSESSMENT — SLIT LAMP EXAM - LIDS
COMMENTS: NORMAL
COMMENTS: NORMAL

## 2025-07-28 ASSESSMENT — EXTERNAL EXAM - LEFT EYE: OS_EXAM: NORMAL

## 2025-07-28 NOTE — PROGRESS NOTES
Assessment/Plan   Problem List Items Addressed This Visit       PVD (posterior vitreous detachment), bilateral - Primary    Stable PVD both eyes (OU), unsure after this length of time if would continue to expect gradual improvement in symptoms with more time. Could pursue retina evaluation if symptoms becoming too bothersome. Will monitor with serial exam and understands to call if any worsening signs.          Combined forms of age-related cataract of both eyes    Non significant cataract noted on exam. Discussed the natural course of cataract and may require surgery at some point in the future. Will plan to continue to monitor with serial exam.            Refraction error    Suspect small hyperopic shift if numbers may explain heaviness and strain better than other etiologies. New Rx for glasses/SCL given per patient request. Patient's signature obtained to acknowledge and confirm that a paper copy of glasses/SCL Rx was given to patient in compliance with [facility] Eyeglass Rule. Electronic copy of Rx will also be available via [employer].               Provided reassurance regarding above diagnoses and care received in the office visit today. Discussed outcomes and options along with the importance of treatment compliance. Understands the importance of any follow up visits. Patient instructed to call/communicate with our office if any new issues, questions, or concerns.     Will plan to see back in 1 year full SCL or sooner PRN

## 2025-07-28 NOTE — ASSESSMENT & PLAN NOTE
Suspect small hyperopic shift if numbers may explain heaviness and strain better than other etiologies. Given trial lenses and copt of same numbers. New Rx for glasses/SCL given per patient request. Patient's signature obtained to acknowledge and confirm that a paper copy of glasses/SCL Rx was given to patient in compliance with [facility] Eyeglass Rule. Electronic copy of Rx will also be available via [employer].

## 2025-07-28 NOTE — ASSESSMENT & PLAN NOTE
Stable PVD both eyes (OU), unsure after this length of time if would continue to expect gradual improvement in symptoms with more time. Could pursue retina evaluation if symptoms becoming too bothersome. Will monitor with serial exam and understands to call if any worsening signs.

## 2025-10-10 ENCOUNTER — APPOINTMENT (OUTPATIENT)
Dept: PRIMARY CARE | Facility: CLINIC | Age: 70
End: 2025-10-10
Payer: MEDICARE

## 2026-07-31 ENCOUNTER — APPOINTMENT (OUTPATIENT)
Dept: OPHTHALMOLOGY | Facility: CLINIC | Age: 71
End: 2026-07-31
Payer: MEDICARE